# Patient Record
Sex: FEMALE | Race: WHITE
[De-identification: names, ages, dates, MRNs, and addresses within clinical notes are randomized per-mention and may not be internally consistent; named-entity substitution may affect disease eponyms.]

---

## 2020-05-26 ENCOUNTER — HOSPITAL ENCOUNTER (OUTPATIENT)
Dept: HOSPITAL 95 - LAB SHORT | Age: 45
Discharge: HOME | End: 2020-05-26
Attending: PHYSICIAN ASSISTANT
Payer: COMMERCIAL

## 2020-05-26 DIAGNOSIS — R10.84: Primary | ICD-10-CM

## 2020-05-26 LAB
ALBUMIN SERPL BCP-MCNC: 3.9 G/DL (ref 3.4–5)
ALBUMIN/GLOB SERPL: 1.1 {RATIO} (ref 0.8–1.8)
ALT SERPL W P-5'-P-CCNC: 37 U/L (ref 12–78)
ANION GAP SERPL CALCULATED.4IONS-SCNC: 7 MMOL/L (ref 6–16)
AST SERPL W P-5'-P-CCNC: 22 U/L (ref 12–37)
BASOPHILS # BLD AUTO: 0.06 K/MM3 (ref 0–0.23)
BASOPHILS NFR BLD AUTO: 1 % (ref 0–2)
BILIRUB SERPL-MCNC: 0.3 MG/DL (ref 0.1–1)
BUN SERPL-MCNC: 15 MG/DL (ref 8–24)
CALCIUM SERPL-MCNC: 9 MG/DL (ref 8.5–10.1)
CHLORIDE SERPL-SCNC: 109 MMOL/L (ref 98–108)
CO2 SERPL-SCNC: 24 MMOL/L (ref 21–32)
CREAT SERPL-MCNC: 0.84 MG/DL (ref 0.4–1)
DEPRECATED RDW RBC AUTO: 39.7 FL (ref 35.1–46.3)
EOSINOPHIL # BLD AUTO: 0.11 K/MM3 (ref 0–0.68)
EOSINOPHIL NFR BLD AUTO: 2 % (ref 0–6)
ERYTHROCYTE [DISTWIDTH] IN BLOOD BY AUTOMATED COUNT: 12.1 % (ref 11.7–14.2)
GLOBULIN SER CALC-MCNC: 3.7 G/DL (ref 2.2–4)
GLUCOSE SERPL-MCNC: 96 MG/DL (ref 70–99)
HCT VFR BLD AUTO: 39.5 % (ref 33–51)
HGB BLD-MCNC: 13 G/DL (ref 11.5–16)
IMM GRANULOCYTES # BLD AUTO: 0.04 K/MM3 (ref 0–0.1)
IMM GRANULOCYTES NFR BLD AUTO: 1 % (ref 0–1)
LYMPHOCYTES # BLD AUTO: 1.94 K/MM3 (ref 0.84–5.2)
LYMPHOCYTES NFR BLD AUTO: 26 % (ref 21–46)
MCHC RBC AUTO-ENTMCNC: 32.9 G/DL (ref 31.5–36.5)
MCV RBC AUTO: 90 FL (ref 80–100)
MONOCYTES # BLD AUTO: 0.62 K/MM3 (ref 0.16–1.47)
MONOCYTES NFR BLD AUTO: 8 % (ref 4–13)
NEUTROPHILS # BLD AUTO: 4.68 K/MM3 (ref 1.96–9.15)
NEUTROPHILS NFR BLD AUTO: 63 % (ref 41–73)
NRBC # BLD AUTO: 0 K/MM3 (ref 0–0.02)
NRBC BLD AUTO-RTO: 0 /100 WBC (ref 0–0.2)
PLATELET # BLD AUTO: 330 K/MM3 (ref 150–400)
POTASSIUM SERPL-SCNC: 3.9 MMOL/L (ref 3.5–5.5)
PROT SERPL-MCNC: 7.6 G/DL (ref 6.4–8.2)
SODIUM SERPL-SCNC: 140 MMOL/L (ref 136–145)

## 2020-09-16 ENCOUNTER — HOSPITAL ENCOUNTER (OUTPATIENT)
Dept: HOSPITAL 95 - ER | Age: 45
Setting detail: OBSERVATION
Discharge: HOME | End: 2020-09-16
Attending: SURGERY | Admitting: SURGERY
Payer: COMMERCIAL

## 2020-09-16 ENCOUNTER — HOSPITAL ENCOUNTER (EMERGENCY)
Dept: HOSPITAL 95 - ER | Age: 45
Discharge: HOME | End: 2020-09-16
Payer: COMMERCIAL

## 2020-09-16 VITALS — WEIGHT: 175 LBS | BODY MASS INDEX: 29.16 KG/M2 | HEIGHT: 65 IN

## 2020-09-16 VITALS — HEIGHT: 65 IN | WEIGHT: 170 LBS | BODY MASS INDEX: 28.32 KG/M2

## 2020-09-16 DIAGNOSIS — K80.00: Primary | ICD-10-CM

## 2020-09-16 DIAGNOSIS — J45.909: ICD-10-CM

## 2020-09-16 DIAGNOSIS — Z88.8: ICD-10-CM

## 2020-09-16 DIAGNOSIS — R10.10: Primary | ICD-10-CM

## 2020-09-16 DIAGNOSIS — Z79.899: ICD-10-CM

## 2020-09-16 DIAGNOSIS — Z88.5: ICD-10-CM

## 2020-09-16 DIAGNOSIS — Z20.828: ICD-10-CM

## 2020-09-16 LAB
ALBUMIN SERPL BCP-MCNC: 3.6 G/DL (ref 3.4–5)
ALBUMIN SERPL BCP-MCNC: 3.8 G/DL (ref 3.4–5)
ALBUMIN/GLOB SERPL: 1 {RATIO} (ref 0.8–1.8)
ALBUMIN/GLOB SERPL: 1.1 {RATIO} (ref 0.8–1.8)
ALT SERPL W P-5'-P-CCNC: 46 U/L (ref 12–78)
ALT SERPL W P-5'-P-CCNC: 48 U/L (ref 12–78)
ANION GAP SERPL CALCULATED.4IONS-SCNC: 10 MMOL/L (ref 6–16)
ANION GAP SERPL CALCULATED.4IONS-SCNC: 6 MMOL/L (ref 6–16)
AST SERPL W P-5'-P-CCNC: 23 U/L (ref 12–37)
AST SERPL W P-5'-P-CCNC: 31 U/L (ref 12–37)
BASOPHILS # BLD AUTO: 0.04 K/MM3 (ref 0–0.23)
BASOPHILS # BLD AUTO: 0.06 K/MM3 (ref 0–0.23)
BASOPHILS NFR BLD AUTO: 0 % (ref 0–2)
BASOPHILS NFR BLD AUTO: 1 % (ref 0–2)
BILIRUB SERPL-MCNC: 0.2 MG/DL (ref 0.1–1)
BILIRUB SERPL-MCNC: 0.2 MG/DL (ref 0.1–1)
BUN SERPL-MCNC: 10 MG/DL (ref 8–24)
BUN SERPL-MCNC: 13 MG/DL (ref 8–24)
CALCIUM SERPL-MCNC: 8.7 MG/DL (ref 8.5–10.1)
CALCIUM SERPL-MCNC: 8.9 MG/DL (ref 8.5–10.1)
CHLORIDE SERPL-SCNC: 108 MMOL/L (ref 98–108)
CHLORIDE SERPL-SCNC: 108 MMOL/L (ref 98–108)
CO2 SERPL-SCNC: 22 MMOL/L (ref 21–32)
CO2 SERPL-SCNC: 24 MMOL/L (ref 21–32)
CREAT SERPL-MCNC: 0.98 MG/DL (ref 0.4–1)
CREAT SERPL-MCNC: 0.99 MG/DL (ref 0.4–1)
DEPRECATED RDW RBC AUTO: 39.9 FL (ref 35.1–46.3)
DEPRECATED RDW RBC AUTO: 40 FL (ref 35.1–46.3)
EOSINOPHIL # BLD AUTO: 0.1 K/MM3 (ref 0–0.68)
EOSINOPHIL # BLD AUTO: 0.2 K/MM3 (ref 0–0.68)
EOSINOPHIL NFR BLD AUTO: 1 % (ref 0–6)
EOSINOPHIL NFR BLD AUTO: 2 % (ref 0–6)
ERYTHROCYTE [DISTWIDTH] IN BLOOD BY AUTOMATED COUNT: 12.2 % (ref 11.7–14.2)
ERYTHROCYTE [DISTWIDTH] IN BLOOD BY AUTOMATED COUNT: 12.3 % (ref 11.7–14.2)
GLOBULIN SER CALC-MCNC: 3.5 G/DL (ref 2.2–4)
GLOBULIN SER CALC-MCNC: 3.6 G/DL (ref 2.2–4)
GLUCOSE SERPL-MCNC: 107 MG/DL (ref 70–99)
GLUCOSE SERPL-MCNC: 91 MG/DL (ref 70–99)
HCT VFR BLD AUTO: 39.8 % (ref 33–51)
HCT VFR BLD AUTO: 40.3 % (ref 33–51)
HGB BLD-MCNC: 13.3 G/DL (ref 11.5–16)
HGB BLD-MCNC: 13.3 G/DL (ref 11.5–16)
IMM GRANULOCYTES # BLD AUTO: 0.04 K/MM3 (ref 0–0.1)
IMM GRANULOCYTES # BLD AUTO: 0.05 K/MM3 (ref 0–0.1)
IMM GRANULOCYTES NFR BLD AUTO: 0 % (ref 0–1)
IMM GRANULOCYTES NFR BLD AUTO: 1 % (ref 0–1)
LYMPHOCYTES # BLD AUTO: 2.03 K/MM3 (ref 0.84–5.2)
LYMPHOCYTES # BLD AUTO: 2.76 K/MM3 (ref 0.84–5.2)
LYMPHOCYTES NFR BLD AUTO: 21 % (ref 21–46)
LYMPHOCYTES NFR BLD AUTO: 33 % (ref 21–46)
MCHC RBC AUTO-ENTMCNC: 33 G/DL (ref 31.5–36.5)
MCHC RBC AUTO-ENTMCNC: 33.4 G/DL (ref 31.5–36.5)
MCV RBC AUTO: 89 FL (ref 80–100)
MCV RBC AUTO: 90 FL (ref 80–100)
MONOCYTES # BLD AUTO: 0.74 K/MM3 (ref 0.16–1.47)
MONOCYTES # BLD AUTO: 0.77 K/MM3 (ref 0.16–1.47)
MONOCYTES NFR BLD AUTO: 8 % (ref 4–13)
MONOCYTES NFR BLD AUTO: 9 % (ref 4–13)
NEUTROPHILS # BLD AUTO: 4.52 K/MM3 (ref 1.96–9.15)
NEUTROPHILS # BLD AUTO: 6.6 K/MM3 (ref 1.96–9.15)
NEUTROPHILS NFR BLD AUTO: 54 % (ref 41–73)
NEUTROPHILS NFR BLD AUTO: 69 % (ref 41–73)
NRBC # BLD AUTO: 0 K/MM3 (ref 0–0.02)
NRBC # BLD AUTO: 0 K/MM3 (ref 0–0.02)
NRBC BLD AUTO-RTO: 0 /100 WBC (ref 0–0.2)
NRBC BLD AUTO-RTO: 0 /100 WBC (ref 0–0.2)
PLATELET # BLD AUTO: 319 K/MM3 (ref 150–400)
PLATELET # BLD AUTO: 325 K/MM3 (ref 150–400)
POTASSIUM SERPL-SCNC: 3.7 MMOL/L (ref 3.5–5.5)
POTASSIUM SERPL-SCNC: 4.1 MMOL/L (ref 3.5–5.5)
PROT SERPL-MCNC: 7.2 G/DL (ref 6.4–8.2)
PROT SERPL-MCNC: 7.3 G/DL (ref 6.4–8.2)
SODIUM SERPL-SCNC: 138 MMOL/L (ref 136–145)
SODIUM SERPL-SCNC: 140 MMOL/L (ref 136–145)
TROPONIN I SERPL-MCNC: <0.015 NG/ML (ref 0–0.04)

## 2020-09-16 PROCEDURE — C9113 INJ PANTOPRAZOLE SODIUM, VIA: HCPCS

## 2020-09-16 PROCEDURE — U0002 COVID-19 LAB TEST NON-CDC: HCPCS

## 2020-09-16 PROCEDURE — BF03YZZ PLAIN RADIOGRAPHY OF GALLBLADDER AND BILE DUCTS USING OTHER CONTRAST: ICD-10-PCS | Performed by: SURGERY

## 2020-09-16 PROCEDURE — 0FT44ZZ RESECTION OF GALLBLADDER, PERCUTANEOUS ENDOSCOPIC APPROACH: ICD-10-PCS | Performed by: SURGERY

## 2020-09-16 PROCEDURE — C1729 CATH, DRAINAGE: HCPCS

## 2020-09-16 PROCEDURE — A9270 NON-COVERED ITEM OR SERVICE: HCPCS

## 2020-09-16 NOTE — NUR
Surgical site prepped with 2% Chlorhexidine cloth wipe.
History, Chart, Medications and Allergies reviewed before start of
procedure. Lungs clear T/O to Auscultation.
Patient confirms NPO status and agrees with scheduled surgery, reports justyna lewis at 0600, MD aware
 
Pre-Op teaching done. Pt verbalizes understanding.

## 2020-09-16 NOTE — NUR
DISCAHRGE
EATING, DRINKING, VOIDING WELL. AMBULATING AND PAIN WELL CONTROLLED.
UNDERSTANDS DISCHAGRE INSTRUCTIONS. ESCORTED OUT VIA WC

## 2021-07-14 ENCOUNTER — HOSPITAL ENCOUNTER (OUTPATIENT)
Dept: HOSPITAL 95 - LAB SHORT | Age: 46
Discharge: HOME | End: 2021-07-14
Attending: OBSTETRICS & GYNECOLOGY
Payer: COMMERCIAL

## 2021-07-14 DIAGNOSIS — Z12.4: Primary | ICD-10-CM

## 2021-07-14 PROCEDURE — G0123 SCREEN CERV/VAG THIN LAYER: HCPCS

## 2021-07-16 LAB — OTHER STN SPEC: (no result)

## 2021-10-11 ENCOUNTER — HOSPITAL ENCOUNTER (OUTPATIENT)
Dept: HOSPITAL 95 - ORSCSDS | Age: 46
Discharge: HOME | End: 2021-10-11
Attending: INTERNAL MEDICINE
Payer: COMMERCIAL

## 2021-10-11 VITALS — WEIGHT: 172.84 LBS | BODY MASS INDEX: 28.8 KG/M2 | HEIGHT: 65 IN

## 2021-10-11 DIAGNOSIS — J45.909: ICD-10-CM

## 2021-10-11 DIAGNOSIS — K20.90: ICD-10-CM

## 2021-10-11 DIAGNOSIS — R10.9: ICD-10-CM

## 2021-10-11 DIAGNOSIS — Z79.899: ICD-10-CM

## 2021-10-11 DIAGNOSIS — K21.9: ICD-10-CM

## 2021-10-11 DIAGNOSIS — K30: Primary | ICD-10-CM

## 2021-10-11 DIAGNOSIS — K44.9: ICD-10-CM

## 2021-10-11 PROCEDURE — 0DB68ZX EXCISION OF STOMACH, VIA NATURAL OR ARTIFICIAL OPENING ENDOSCOPIC, DIAGNOSTIC: ICD-10-PCS | Performed by: INTERNAL MEDICINE

## 2021-10-11 PROCEDURE — 0DB98ZX EXCISION OF DUODENUM, VIA NATURAL OR ARTIFICIAL OPENING ENDOSCOPIC, DIAGNOSTIC: ICD-10-PCS | Performed by: INTERNAL MEDICINE

## 2022-09-07 ENCOUNTER — HOSPITAL ENCOUNTER (OUTPATIENT)
Dept: HOSPITAL 95 - LAB SHORT | Age: 47
Discharge: HOME | End: 2022-09-07
Attending: OBSTETRICS & GYNECOLOGY
Payer: COMMERCIAL

## 2022-09-07 DIAGNOSIS — Z01.812: Primary | ICD-10-CM

## 2022-09-07 LAB
ALBUMIN SERPL BCP-MCNC: 4.1 G/DL (ref 3.4–5)
ALBUMIN/GLOB SERPL: 1.4 {RATIO} (ref 0.8–1.8)
ALT SERPL W P-5'-P-CCNC: 38 U/L (ref 12–78)
ANION GAP SERPL CALCULATED.4IONS-SCNC: 5 MMOL/L (ref 6–16)
AST SERPL W P-5'-P-CCNC: 27 U/L (ref 12–37)
BASOPHILS # BLD AUTO: 0.06 K/MM3 (ref 0–0.23)
BASOPHILS NFR BLD AUTO: 1 % (ref 0–2)
BILIRUB SERPL-MCNC: 0.4 MG/DL (ref 0.1–1)
BUN SERPL-MCNC: 10 MG/DL (ref 8–24)
CALCIUM SERPL-MCNC: 9.8 MG/DL (ref 8.5–10.1)
CHLORIDE SERPL-SCNC: 108 MMOL/L (ref 98–108)
CO2 SERPL-SCNC: 27 MMOL/L (ref 21–32)
CREAT SERPL-MCNC: 0.79 MG/DL (ref 0.4–1)
DEPRECATED RDW RBC AUTO: 40.7 FL (ref 35.1–46.3)
EOSINOPHIL # BLD AUTO: 0.07 K/MM3 (ref 0–0.68)
EOSINOPHIL NFR BLD AUTO: 1 % (ref 0–6)
ERYTHROCYTE [DISTWIDTH] IN BLOOD BY AUTOMATED COUNT: 11.9 % (ref 11.7–14.2)
GLOBULIN SER CALC-MCNC: 3 G/DL (ref 2.2–4)
GLUCOSE SERPL-MCNC: 86 MG/DL (ref 70–99)
HCT VFR BLD AUTO: 42.5 % (ref 33–51)
HGB BLD-MCNC: 13.9 G/DL (ref 11.5–16)
IMM GRANULOCYTES # BLD AUTO: 0.05 K/MM3 (ref 0–0.1)
IMM GRANULOCYTES NFR BLD AUTO: 1 % (ref 0–1)
LYMPHOCYTES # BLD AUTO: 2.31 K/MM3 (ref 0.84–5.2)
LYMPHOCYTES NFR BLD AUTO: 23 % (ref 21–46)
MCHC RBC AUTO-ENTMCNC: 32.7 G/DL (ref 31.5–36.5)
MCV RBC AUTO: 93 FL (ref 80–100)
MONOCYTES # BLD AUTO: 0.75 K/MM3 (ref 0.16–1.47)
MONOCYTES NFR BLD AUTO: 8 % (ref 4–13)
NEUTROPHILS # BLD AUTO: 6.63 K/MM3 (ref 1.96–9.15)
NEUTROPHILS NFR BLD AUTO: 67 % (ref 41–73)
NRBC # BLD AUTO: 0 K/MM3 (ref 0–0.02)
NRBC BLD AUTO-RTO: 0 /100 WBC (ref 0–0.2)
PLATELET # BLD AUTO: 330 K/MM3 (ref 150–400)
POTASSIUM SERPL-SCNC: 4 MMOL/L (ref 3.5–5.5)
PROT SERPL-MCNC: 7.1 G/DL (ref 6.4–8.2)
SODIUM SERPL-SCNC: 140 MMOL/L (ref 136–145)

## 2022-09-08 ENCOUNTER — HOSPITAL ENCOUNTER (OUTPATIENT)
Dept: HOSPITAL 95 - ORSCMMR | Age: 47
Discharge: HOME | End: 2022-09-08
Attending: OBSTETRICS & GYNECOLOGY
Payer: COMMERCIAL

## 2022-09-08 VITALS — HEIGHT: 65 IN | WEIGHT: 139.99 LBS | BODY MASS INDEX: 23.32 KG/M2

## 2022-09-08 DIAGNOSIS — N83.8: ICD-10-CM

## 2022-09-08 DIAGNOSIS — N80.0: ICD-10-CM

## 2022-09-08 DIAGNOSIS — Z79.899: ICD-10-CM

## 2022-09-08 DIAGNOSIS — Q50.5: ICD-10-CM

## 2022-09-08 DIAGNOSIS — J45.909: ICD-10-CM

## 2022-09-08 DIAGNOSIS — D25.9: ICD-10-CM

## 2022-09-08 DIAGNOSIS — N93.9: Primary | ICD-10-CM

## 2022-09-08 LAB
BASOPHILS # BLD AUTO: 0.02 K/MM3 (ref 0–0.23)
BASOPHILS NFR BLD AUTO: 0 % (ref 0–2)
DEPRECATED RDW RBC AUTO: 39.5 FL (ref 35.1–46.3)
EOSINOPHIL # BLD AUTO: 0.01 K/MM3 (ref 0–0.68)
EOSINOPHIL NFR BLD AUTO: 0 % (ref 0–6)
ERYTHROCYTE [DISTWIDTH] IN BLOOD BY AUTOMATED COUNT: 12 % (ref 11.7–14.2)
HCT VFR BLD AUTO: 38.2 % (ref 33–51)
HGB BLD-MCNC: 12.9 G/DL (ref 11.5–16)
IMM GRANULOCYTES # BLD AUTO: 0.06 K/MM3 (ref 0–0.1)
IMM GRANULOCYTES NFR BLD AUTO: 0 % (ref 0–1)
LYMPHOCYTES # BLD AUTO: 0.75 K/MM3 (ref 0.84–5.2)
LYMPHOCYTES NFR BLD AUTO: 5 % (ref 21–46)
MCHC RBC AUTO-ENTMCNC: 33.8 G/DL (ref 31.5–36.5)
MCV RBC AUTO: 90 FL (ref 80–100)
MONOCYTES # BLD AUTO: 0.28 K/MM3 (ref 0.16–1.47)
MONOCYTES NFR BLD AUTO: 2 % (ref 4–13)
NEUTROPHILS # BLD AUTO: 13.04 K/MM3 (ref 1.96–9.15)
NEUTROPHILS NFR BLD AUTO: 92 % (ref 41–73)
NRBC # BLD AUTO: 0 K/MM3 (ref 0–0.02)
NRBC BLD AUTO-RTO: 0 /100 WBC (ref 0–0.2)
PLATELET # BLD AUTO: 250 K/MM3 (ref 150–400)

## 2022-09-08 PROCEDURE — 0UT9FZZ RESECTION OF UTERUS, VIA NATURAL OR ARTIFICIAL OPENING WITH PERCUTANEOUS ENDOSCOPIC ASSISTANCE: ICD-10-PCS | Performed by: OBSTETRICS & GYNECOLOGY

## 2022-09-08 PROCEDURE — A9270 NON-COVERED ITEM OR SERVICE: HCPCS

## 2022-09-08 PROCEDURE — 0UT7FZZ RESECTION OF BILATERAL FALLOPIAN TUBES, VIA NATURAL OR ARTIFICIAL OPENING WITH PERCUTANEOUS ENDOSCOPIC ASSISTANCE: ICD-10-PCS | Performed by: OBSTETRICS & GYNECOLOGY

## 2022-09-08 NOTE — NUR
Ambulatory in Day Surgery
History, Chart, Medications and Allergies reviewed before start of
procedure.Patient confirms NPO status and agrees with scheduled surgery.
Pre-Op teaching done. Pt verbalizes understanding.
Patient States Post-Procedure ride home has been arranged.

## 2022-09-08 NOTE — NUR
PT HAD COVID TESTING DONE AT THE [facility] ON 09/06/22. HARD COPY OF
NEGATIVE RESULTS IN CHART

## 2022-09-08 NOTE — NUR
DISCHARGE
PT PROVIDED WITH WRITTEN AND VERBAL DISCHARGE INSTRUCTIONS; SHE REPORTED
UNDERSTANDING.  PT MEETING ALL GOALS PRIOR TO DISCHARGE, SHE WAS ABLE TO VOID,
TOLERATE PO, AMBULATE AND PAIN MANAGED WITH OXYCODONE.  PT HAS HAD SOME
URINARY RETENTION DESPITE BEING ABLE TO VOID, SHE VOIDED X2 AND AFTER THE
SECOND VOID STILL HAD 195ML IN HER BLADDER.  DR. FRENCH WAS NOTIFIED BEFORE
DISCHARGE, SHE STATED OK TO CONTINUE WITH DISCHARGE.  PT WAS PROVIDED
EDUCATION REGARDING URINARY RETENTION AND TO CONTACT DR. FRENCH IF SHE HAS
INCREASED PAIN, BLADDER DISCOMFORT OR FEELING LIKE HER BLADDER IS FULL, OR SHE
IS UNABLE TO VOID.  PT AMBULATED OUT WITHOUT NEED FOR ASSISTANCE AT 1746.

## 2022-09-08 NOTE — NUR
PT ARRIVED TO THE ROOM AT APPROXIMATELY 1005.  PT DROWSY BUT AWAKE UPON
ARRIVAL.  SHE REPORTED 10/10 PAIN. PT'S VSS.  SCANT VAGINAL SPOTTING.  FINCH
CATH IN PLACE AND DRAINING CLEAR YELLOW URINE.  PT'S FRIEND JENNIE NOTIFIED
OF PT'S ARRIVAL TO THE ROOM.  WILL CONTINUE TO MONITOR AND TREAT FOR PAIN.

## 2024-10-28 NOTE — NUR
PT AMBULATED TO THE BATHROOM INDEPENDENTLY, SHE WAS ABLE TO VOID 250ML.  PVR
SHOWED 158ML REMAINING IN THE BLADDER.  WILL CONTINUE TO MONITOR. Detail Level: Detailed Depth Of Biopsy: dermis Was A Bandage Applied: Yes Size Of Lesion In Cm: 0.6 X Size Of Lesion In Cm: 0 Biopsy Type: H and E Biopsy Method: Personna blade Anesthesia Type: 1% lidocaine with epinephrine Anesthesia Volume In Cc: 0.5 Hemostasis: Aluminum Chloride Wound Care: Vaseline Dressing: pressure dressing with telfa Destruction After The Procedure: No Type Of Destruction Used: Curettage Cryotherapy Text: The wound bed was treated with cryotherapy after the biopsy was performed. Electrodesiccation Text: The wound bed was treated with electrodesiccation after the biopsy was performed. Electrodesiccation And Curettage Text: The wound bed was treated with electrodesiccation and curettage after the biopsy was performed. Silver Nitrate Text: The wound bed was treated with silver nitrate after the biopsy was performed. Lab: [id number] Lab Facility: [id number] Consent: The provider's intent is to obtain a tissue sample solely for diagnostic purposes.  Written consent to obtain tissue sample was obtained and risks were reviewed including but not limited to scarring, infection, bleeding, scabbing, incomplete removal, nerve damage and allergy to anesthesia. Post-Care Instructions: Clean the wound with antibacterial soap and water twice a day.  If crust develops, soak with moist gauze. Apply a thin layer of Vaseline to the area twice a day until wound is healed. Cover with a clean band-aid\\nBleeding is rare, but if it should occur, apply direct pressure to the bleeding site for a full 15 minutes without easing up.  If the bleeding continues, despite your efforts, please call the office. If it is after hours, call [phone] to speak to a provider.  It may be necessary to go to the emergency room. Patient also received handout. Notification Instructions: Patient will be notified of biopsy results. However, patient instructed to call the office if not contacted within 2 weeks. Billing Type: Third-Party Bill Information: Selecting Yes will display possible errors in your note based on the variables you have selected. This validation is only offered as a suggestion for you. PLEASE NOTE THAT THE VALIDATION TEXT WILL BE REMOVED WHEN YOU FINALIZE YOUR NOTE. IF YOU WANT TO FAX A PRELIMINARY NOTE YOU WILL NEED TO TOGGLE THIS TO 'NO' IF YOU DO NOT WANT IT IN YOUR FAXED NOTE.